# Patient Record
Sex: MALE | Race: OTHER | NOT HISPANIC OR LATINO | URBAN - METROPOLITAN AREA
[De-identification: names, ages, dates, MRNs, and addresses within clinical notes are randomized per-mention and may not be internally consistent; named-entity substitution may affect disease eponyms.]

---

## 2017-01-26 ENCOUNTER — EMERGENCY (EMERGENCY)
Facility: HOSPITAL | Age: 22
LOS: 1 days | Discharge: HOME | End: 2017-01-26
Attending: EMERGENCY MEDICINE
Payer: SELF-PAY

## 2017-01-26 VITALS
HEART RATE: 59 BPM | SYSTOLIC BLOOD PRESSURE: 131 MMHG | WEIGHT: 229.94 LBS | RESPIRATION RATE: 16 BRPM | TEMPERATURE: 98 F | HEIGHT: 76 IN | DIASTOLIC BLOOD PRESSURE: 75 MMHG | OXYGEN SATURATION: 100 %

## 2017-01-26 DIAGNOSIS — S61.411A LACERATION WITHOUT FOREIGN BODY OF RIGHT HAND, INITIAL ENCOUNTER: ICD-10-CM
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
## 2017-01-26 DIAGNOSIS — S60.511A ABRASION OF RIGHT HAND, INITIAL ENCOUNTER: ICD-10-CM

## 2017-01-26 PROCEDURE — 73130 X-RAY EXAM OF HAND: CPT | Mod: 26,RT

## 2017-01-26 PROCEDURE — 99283 EMERGENCY DEPT VISIT LOW MDM: CPT

## 2017-01-26 PROCEDURE — 73130 X-RAY EXAM OF HAND: CPT

## 2017-01-26 PROCEDURE — 99283 EMERGENCY DEPT VISIT LOW MDM: CPT | Mod: 25

## 2017-01-26 RX ORDER — IBUPROFEN 200 MG
800 TABLET ORAL ONCE
Qty: 0 | Refills: 0 | Status: COMPLETED | OUTPATIENT
Start: 2017-01-26 | End: 2017-01-26

## 2017-01-26 RX ORDER — TETANUS TOXOID, REDUCED DIPHTHERIA TOXOID AND ACELLULAR PERTUSSIS VACCINE, ADSORBED 5; 2.5; 8; 8; 2.5 [IU]/.5ML; [IU]/.5ML; UG/.5ML; UG/.5ML; UG/.5ML
0.5 SUSPENSION INTRAMUSCULAR ONCE
Qty: 0 | Refills: 0 | Status: DISCONTINUED | OUTPATIENT
Start: 2017-01-26 | End: 2017-01-26

## 2017-01-26 RX ADMIN — Medication 1 TABLET(S): at 23:03

## 2017-01-26 RX ADMIN — Medication 800 MILLIGRAM(S): at 23:03

## 2017-01-26 NOTE — ED ADULT TRIAGE NOTE - CHIEF COMPLAINT QUOTE
pt presents to ED With dog bite to right palm. multiple puncture wounds noted. bleeding controlled. pt statse he has abrasion noted to right upper thigh from dog. pt stats he knows dog, dog is up to date with shots.

## 2017-01-26 NOTE — ED STATDOCS - NS ED MD SCRIBE ATTENDING SCRIBE SECTIONS
REVIEW OF SYSTEMS/DISPOSITION/PHYSICAL EXAM/PAST MEDICAL/SURGICAL/SOCIAL HISTORY/INTAKE ASSESSMENT/SCREENINGS/VITAL SIGNS( Pullset)/HIV/HISTORY OF PRESENT ILLNESS

## 2017-01-26 NOTE — ED STATDOCS - ATTENDING CONTRIBUTION TO CARE
I, Supa Balderas, performed the initial face to face bedside interview with this patient regarding history of present illness, review of symptoms and relevant past medical, social and family history.  I completed an independent physical examination.  I was the initial provider who evaluated this patient. I have signed out the follow up of any pending tests (i.e. labs, radiological studies) to the ACP.  I have communicated the patient’s plan of care and disposition with the ACP.  The history, relevant review of systems, past medical and surgical history, medical decision making, and physical examination was documented by the scribe in my presence and I attest to the accuracy of the documentation.

## 2017-01-26 NOTE — ED STATDOCS - MEDICAL DECISION MAKING DETAILS
Dog bite to right hand, LHD. XR to evaluate for bony injury given tetanus pain meds and PO abx. Place in super track for irrigation profusely, use Bacitracin and dry sterile dressing. Check results, out-pt follow-ups as instructed.

## 2017-01-26 NOTE — ED STATDOCS - PROGRESS NOTE DETAILS
Pt's wound were cleansed thoroughly and appropriately, proper dressing applied. Pt admits to surgery and + pin placement to right thumb base. Cx on proper wound care. f/u with pcp in 1-2 days.

## 2017-01-26 NOTE — ED STATDOCS - SKIN, MLM
3 jagged lacs to the drake aspect, 1cm, 1cm, 2cm. 2+ superficial abrasion of dorsal aspect of right hand.

## 2017-01-26 NOTE — ED STATDOCS - OBJECTIVE STATEMENT
22 y/o male presents to ED, with family at bedside, c/o lacs and abrasions on his right hand s/p dog bite onset today. The dog is a family dog that is UTD with his shots; the dog was very protective and attacked the pt because he was too close. Left hand dominant. Unsure when he received his last tetanus shot. No further complaints at this time. NKDA.

## 2017-01-27 RX ORDER — IBUPROFEN 200 MG
1 TABLET ORAL
Qty: 15 | Refills: 0 | OUTPATIENT
Start: 2017-01-27 | End: 2017-02-01

## 2020-01-08 ENCOUNTER — TRANSCRIPTION ENCOUNTER (OUTPATIENT)
Age: 25
End: 2020-01-08

## 2020-12-18 ENCOUNTER — TRANSCRIPTION ENCOUNTER (OUTPATIENT)
Age: 25
End: 2020-12-18

## 2023-07-27 ENCOUNTER — APPOINTMENT (OUTPATIENT)
Dept: DERMATOLOGY | Facility: CLINIC | Age: 28
End: 2023-07-27
Payer: COMMERCIAL

## 2023-07-27 PROCEDURE — 99203 OFFICE O/P NEW LOW 30 MIN: CPT

## 2023-11-02 PROBLEM — Z00.00 ENCOUNTER FOR PREVENTIVE HEALTH EXAMINATION: Status: ACTIVE | Noted: 2023-11-02

## 2024-01-04 ENCOUNTER — OFFICE (OUTPATIENT)
Dept: URBAN - METROPOLITAN AREA CLINIC 12 | Facility: CLINIC | Age: 29
Setting detail: OPHTHALMOLOGY
End: 2024-01-04
Payer: MEDICAID

## 2024-01-04 DIAGNOSIS — H52.13: ICD-10-CM

## 2024-01-04 PROCEDURE — SCREF LASIK EVAL: Performed by: OPHTHALMOLOGY

## 2024-01-04 ASSESSMENT — SPHEQUIV_DERIVED
OS_SPHEQUIV: -7.125
OS_SPHEQUIV: -7.125
OD_SPHEQUIV: -5.125
OD_SPHEQUIV: -5.125

## 2024-01-04 ASSESSMENT — REFRACTION_MANIFEST
OS_VA1: 20/20-1
OD_SPHERE: -4.75
OD_AXIS: 065
OS_CYLINDER: -0.25
OS_AXIS: 140
OD_VA1: 20/20-1
OS_SPHERE: -7.00
OD_CYLINDER: -0.75

## 2024-01-04 ASSESSMENT — REFRACTION_CURRENTRX
OS_VPRISM_DIRECTION: SV
OS_SPHERE: -7.00
OD_CYLINDER: -0.25
OS_CYLINDER: SPHERE
OS_AXIS: 000
OD_OVR_VA: 20/
OD_SPHERE: -4.75
OS_OVR_VA: 20/
OD_VPRISM_DIRECTION: SV
OD_AXIS: 041

## 2024-01-04 ASSESSMENT — REFRACTION_AUTOREFRACTION
OD_SPHERE: -4.75
OD_CYLINDER: -0.75
OS_CYLINDER: -0.25
OD_AXIS: 063
OS_SPHERE: -7.00
OS_AXIS: 138

## 2024-01-04 ASSESSMENT — CONFRONTATIONAL VISUAL FIELD TEST (CVF)
OS_FINDINGS: FULL
OD_FINDINGS: FULL

## 2024-01-23 ENCOUNTER — OFFICE (OUTPATIENT)
Facility: LOCATION | Age: 29
Setting detail: OPHTHALMOLOGY
End: 2024-01-23
Payer: MEDICAID

## 2024-01-23 DIAGNOSIS — H52.13: ICD-10-CM

## 2024-01-23 PROCEDURE — SCREF LASIK EVAL: Performed by: OPHTHALMOLOGY

## 2024-01-23 ASSESSMENT — CONFRONTATIONAL VISUAL FIELD TEST (CVF)
OD_FINDINGS: FULL
OS_FINDINGS: FULL

## 2024-01-24 ASSESSMENT — REFRACTION_AUTOREFRACTION
OS_CYLINDER: -0.25
OD_AXIS: 063
OS_SPHERE: -7.00
OS_AXIS: 138
OD_SPHERE: -4.75
OD_CYLINDER: -0.75

## 2024-01-24 ASSESSMENT — REFRACTION_CURRENTRX
OD_OVR_VA: 20/
OS_SPHERE: -7.00
OS_CYLINDER: SPHERE
OD_CYLINDER: -0.25
OD_AXIS: 041
OD_SPHERE: -4.75
OS_VPRISM_DIRECTION: SV
OS_AXIS: 000
OD_VPRISM_DIRECTION: SV
OS_OVR_VA: 20/

## 2024-01-24 ASSESSMENT — REFRACTION_MANIFEST
OS_VA1: 20/20-1
OD_CYLINDER: -0.75
OD_SPHERE: -4.75
OS_SPHERE: -7.00
OD_AXIS: 065
OS_CYLINDER: -0.25
OD_VA1: 20/20-1
OS_AXIS: 140

## 2024-01-24 ASSESSMENT — SPHEQUIV_DERIVED
OS_SPHEQUIV: -7.125
OS_SPHEQUIV: -7.125
OD_SPHEQUIV: -5.125
OD_SPHEQUIV: -5.125

## 2024-01-29 ENCOUNTER — OFFICE (OUTPATIENT)
Dept: URBAN - METROPOLITAN AREA CLINIC 116 | Facility: CLINIC | Age: 29
Setting detail: OPHTHALMOLOGY
End: 2024-01-29
Payer: MEDICAID

## 2024-01-29 DIAGNOSIS — H52.13: ICD-10-CM

## 2024-01-29 DIAGNOSIS — H01.002: ICD-10-CM

## 2024-01-29 DIAGNOSIS — H01.001: ICD-10-CM

## 2024-01-29 DIAGNOSIS — H16.223: ICD-10-CM

## 2024-01-29 DIAGNOSIS — H01.004: ICD-10-CM

## 2024-01-29 DIAGNOSIS — H01.005: ICD-10-CM

## 2024-01-29 PROCEDURE — CLEST CL LENS FIT ESTABLISHED WEARER FITTING ONLY: Performed by: OPTOMETRIST

## 2024-01-29 PROCEDURE — 92014 COMPRE OPH EXAM EST PT 1/>: CPT | Performed by: OPTOMETRIST

## 2024-01-29 ASSESSMENT — REFRACTION_CURRENTRX
OD_VPRISM_DIRECTION: SV
OS_AXIS: 000
OD_AXIS: 041
OS_SPHERE: -6.50
OS_OVR_VA: 20/
OS_CYLINDER: -0.25
OD_OVR_VA: 20/
OD_CYLINDER: -0.25
OD_AXIS: 065
OS_VPRISM_DIRECTION: SV
OS_OVR_VA: 20/
OD_SPHERE: -4.75
OD_VPRISM_DIRECTION: SV
OS_SPHERE: -7.00
OD_OVR_VA: 20/
OS_CYLINDER: SPHERE
OS_AXIS: 135
OS_VPRISM_DIRECTION: SV
OD_SPHERE: -4.50
OD_CYLINDER: -0.25

## 2024-01-29 ASSESSMENT — SPHEQUIV_DERIVED
OD_SPHEQUIV: -5.25
OS_SPHEQUIV: -6.875
OS_SPHEQUIV: -7.125
OD_SPHEQUIV: -5.125

## 2024-01-29 ASSESSMENT — REFRACTION_MANIFEST
OS_AXIS: 140
OD_SPHERE: -5.00
OD_CYLINDER: SPH
OS_SPHERE: -6.50
OS_VA1: 20/20-1
OD_CYLINDER: -0.75
OD_VA1: 20/20-1
OD_SPHERE: -4.75
OS_CYLINDER: SPH
OS_CYLINDER: -0.25
OS_SPHERE: -7.00
OD_AXIS: 065

## 2024-01-29 ASSESSMENT — REFRACTION_AUTOREFRACTION
OS_AXIS: 140
OD_CYLINDER: -0.50
OS_SPHERE: -6.75
OD_SPHERE: -5.00
OD_AXIS: 035
OS_CYLINDER: -0.25

## 2024-01-29 ASSESSMENT — CONFRONTATIONAL VISUAL FIELD TEST (CVF)
OD_FINDINGS: FULL
OS_FINDINGS: FULL

## 2024-01-29 ASSESSMENT — SUPERFICIAL PUNCTATE KERATITIS (SPK)
OS_SPK: 1+ 2+
OD_SPK: 1+ 2+

## 2024-01-29 ASSESSMENT — LID EXAM ASSESSMENTS
OS_BLEPHARITIS: LLL LUL T
OD_BLEPHARITIS: RLL RUL T

## 2024-03-11 ENCOUNTER — EMERGENCY (EMERGENCY)
Facility: HOSPITAL | Age: 29
LOS: 1 days | Discharge: HOME | End: 2024-03-11
Attending: EMERGENCY MEDICINE
Payer: MEDICAID

## 2024-03-11 VITALS
SYSTOLIC BLOOD PRESSURE: 116 MMHG | RESPIRATION RATE: 18 BRPM | DIASTOLIC BLOOD PRESSURE: 68 MMHG | OXYGEN SATURATION: 99 % | HEART RATE: 81 BPM | TEMPERATURE: 98 F

## 2024-03-11 PROCEDURE — 94640 AIRWAY INHALATION TREATMENT: CPT

## 2024-03-11 PROCEDURE — 99283 EMERGENCY DEPT VISIT LOW MDM: CPT

## 2024-03-11 PROCEDURE — 99282 EMERGENCY DEPT VISIT SF MDM: CPT

## 2024-03-11 RX ORDER — FLUTICASONE PROPIONATE 50 MCG
1 SPRAY, SUSPENSION NASAL ONCE
Refills: 0 | Status: COMPLETED | OUTPATIENT
Start: 2024-03-11 | End: 2024-03-11

## 2024-03-11 RX ADMIN — Medication 1 SPRAY(S): at 23:31

## 2024-03-11 NOTE — ED PROVIDER NOTE - CLINICAL SUMMARY MEDICAL DECISION MAKING FREE TEXT BOX
29 yo male no PMHx presents to ED c/o right nostril being stuffed x2 weeks. Worse at night. Sxms began after medicating with Afrin several times a day for 2 weeks straight while sick. ADdvised to not use Afrin, start Flonase for sxms relief. Medically stable for discharge.

## 2024-03-11 NOTE — ED PROVIDER NOTE - OBJECTIVE STATEMENT
27 yo male no PMHx presents to ED c/o right nostril being stuffed x2 weeks. Worse at night. At this time, "not too bad right now." Patient was medicating with Afrin several times a day for 2 weeks straight while sick. No further complaints at this time.  Denies fevers, discharge.

## 2024-03-11 NOTE — ED PROVIDER NOTE - PATIENT PORTAL LINK FT
You can access the FollowMyHealth Patient Portal offered by [facility] by registering at the following website: [email or web address]. By joining [facility]’s FollowMyHealth portal, you will also be able to view your health information using other applications (apps) compatible with our system.

## 2024-03-11 NOTE — ED PROVIDER NOTE - PHYSICAL EXAMINATION
Gen: Nontoxic, well appearing, in NAD.  Skin: Warm and dry as visualized.  Head: NC/AT.  Eyes: PERRLA. EOMI.  Nose: Right sided turbinates swollen compared to left.   Neck: Supple, FROM. Trachea midline.   Resp: No distress.  Cardio: Well perfused.  Ext: No deformities. MAEx4. FROM.   Neuro: A&Ox3. Appears nonfocal.   Psych: Normal affect and mood.

## 2024-03-11 NOTE — ED PROVIDER NOTE - NSFOLLOWUPINSTRUCTIONS_ED_ALL_ED_FT
- Flonase: 1-2 sprays once daily.   - Please bring all documentation from your ED visit to any related future follow up appointment.  - Please call to schedule follow up appointment with your primary care physician within 24-48 hours.  - Please seek immediate medical attention or return to the ED for any new/worsening, signs/symptoms, or concerns.    Feel better!

## 2024-03-11 NOTE — ED PROVIDER NOTE - ATTENDING APP SHARED VISIT CONTRIBUTION OF CARE
Skinny: I performed a face to face bedside interview with patient regarding history of present illness, review of symptoms and past medical history. I completed an independent physical exam.  I have discussed patient's plan of care with advanced care provider.   I agree with note as stated above including HISTORY OF PRESENT ILLNESS, HIV, PAST MEDICAL/SURGICAL/FAMILY/SOCIAL HISTORY, ALLERGIES AND HOME MEDICATIONS, REVIEW OF SYSTEMS, PHYSICAL EXAM, MEDICAL DECISION MAKING and any PROGRESS NOTES during the time I functioned as the attending physician for this patient  unless otherwise noted. My brief assessment is as follows: no pmh with flu several weeks ago, using afrin frequently over past two weeks stating difficult breathing through right nose. no other complaints. non toxic, well appearing. some inflammation right turbinates, nl resp rate/effort. flonase, return precautions.

## 2024-03-11 NOTE — ED PROVIDER NOTE - CARE PROVIDER_API CALL
Clyde Whitman  Otolaryngology  [address] [place] [address], [address]  [place] [address]  Phone: [phone]  Fax: [phone]  Follow Up Time:

## 2024-04-03 ENCOUNTER — APPOINTMENT (OUTPATIENT)
Dept: ORTHOPEDIC SURGERY | Facility: CLINIC | Age: 29
End: 2024-04-03
Payer: MEDICAID

## 2024-04-03 VITALS — HEIGHT: 76 IN | WEIGHT: 240 LBS | BODY MASS INDEX: 29.22 KG/M2

## 2024-04-03 DIAGNOSIS — M23.201 DERANGEMENT OF UNSPECIFIED LATERAL MENISCUS DUE TO OLD TEAR OR INJURY, LEFT KNEE: ICD-10-CM

## 2024-04-03 DIAGNOSIS — Z78.9 OTHER SPECIFIED HEALTH STATUS: ICD-10-CM

## 2024-04-03 PROCEDURE — 99204 OFFICE O/P NEW MOD 45 MIN: CPT

## 2024-04-03 NOTE — DISCUSSION/SUMMARY
[de-identified] : Assessment: The patient is a 28 year old male with left knee pain and physical exam findings consistent with h/o LMT of left knee.  Patient and I discussed their symptoms. Discussed findings of today's exam and possible causes of patient's pain. Educated patient on their most probable diagnosis. Reviewed possible courses of treatment, and we collaboratively decided best course of treatment at this time will include:  1. MR Arthrogram of left knee to eval for LMT  The patient's current medication management of their orthopedic diagnosis was reviewed today:   (1) We discussed a comprehensive treatment plan that included possible pharmaceutical management involving the use of prescription strength medications including but not limited to options such as oral Naprosyn 500mg BID, once daily Meloxicam 15 mg, or 500-650 mg Tylenol versus over the counter oral medications and topical prescription NSAID Pennsaid vs over the counter Voltaren gel.   (2) There is a moderate risk of morbidity with further treatment, especially from use of prescription strength medications and possible side effects of these medications which include upset stomach with oral medications, skin reactions to topical medications and cardiac/renal issues with long term use.   (3) I recommended that the patient follow-up with their medical physician to discuss any significant specific potential issues with long term medication use such as interactions with current medications or with exacerbation of underlying medical comorbidities.   (4) The benefits and risks associated with use of injectable, oral or topical, prescription and over the counter anti-inflammatory medications were discussed with the patient. The patient voiced understanding of the risks including but not limited to bleeding, stroke, kidney dysfunction, heart disease, and were referred to the black box warning label for further information.  Follow up after MRI.

## 2024-04-03 NOTE — HISTORY OF PRESENT ILLNESS
[de-identified] : The patient is a 28 year old [right] hand dominant M who presents today complaining of left knee pain.  Date of Injury/Onset: 02/24 Pain: At Rest: 0/10 With Activity:  3/10 Mechanism of injury: NKI, was previously getting knee drained due to Baker's cyst. Was tackled in [employer] in June 2023 and had immediate pain This is [not] a Work Related Injury being treated under Worker's Compensation. This is [not] an athletic injury occurring associated with an interscholastic or organized sports team. Quality of symptoms: swelling, sharp pains, soreness in posterior and lateral aspect of knee with certain movements Improves with: Worse with: running, walking for long periods of time.  Prior treatment/ Imaging: post-op and pre surgery MRI's  Out of work/sport: Currently working School/Sport/Position/Occupation:  Additional Information: had 2 lateral meniscus surgery, 1st in August 2014, 2nd in May 2015, right knee patellar tendon repair at [place] in the past  **Patient refuses X-rays in office today**

## 2024-04-03 NOTE — IMAGING
[de-identified] : The patient is a well appearing 28 year old male of their stated age. Patient ambulates with a normal gait. Negative straight leg raise bilateral   LEFT Knee:                          ROM:  0-145 degrees   Lachman: Negative Pivot Shift: Negative Anterior Drawer: Negative Posterior Drawer / Sag: Negative Varus Stress 0 degrees: Stable Varus Stress 30 degrees: Stable Valgus Stress 0 degrees: Stable Valgus Stress 30 degrees: Stable Medial Shanna: Negative Lateral Shanna: Positive Patella Glide: 2+ Patella Apprehension: Negative Patella Grind: Negative   Palpation: Medial Joint Line: Nontender Lateral Joint Line: TTP Medial Collateral Ligament: Nontender Lateral Collateral Ligament/PLC: Nontender Distal Femur: Nontender Proximal Tibia: Nontender Tibial Tubercle: Nontender Distal Pole Patella: Nontender Quadriceps Tendon: Nontender &  Intact Patella Tendon: Nontender &  Intact Medial Distal Hamstring/PES: Nontender Lateral Distal Hamstring: Nontender & Stable Iliotibial Band: Nontender Medial Patellofemoral Ligament: Nontender Adductor: Nontender Proximal GSC-Plantaris: Nontender Calf: Supple & Nontender   Inspection: Incisions are well healed Deformity: No Erythema: No Ecchymosis: No Abrasions: No Effusion: Moderate Prepatellar Bursitis: No   Neurologic Exam: Sensation L4-S1: Grossly Intact   Motor Exam: Quadriceps: 5 out of 5 Hamstrings: 5 out of 5 EHL: 5 out of 5 FHL: 5 out of 5 TA: 5 out of 5 GS: 5 out of 5   Circulatory/Pulses: Dorsalis Pedis: 2+ Posterior Tibialis: 2+

## 2024-04-09 ENCOUNTER — RESULT REVIEW (OUTPATIENT)
Age: 29
End: 2024-04-09

## 2024-04-10 ENCOUNTER — APPOINTMENT (OUTPATIENT)
Dept: ORTHOPEDIC SURGERY | Facility: CLINIC | Age: 29
End: 2024-04-10
Payer: MEDICAID

## 2024-04-10 PROCEDURE — 99214 OFFICE O/P EST MOD 30 MIN: CPT

## 2024-04-16 NOTE — DATA REVIEWED
[MRI] : MRI [Left] : left [Knee] : knee [Report was reviewed and noted in the chart] : The report was reviewed and noted in the chart [I independently reviewed and interpreted images and report] : I independently reviewed and interpreted images and report [I reviewed the films/CD] : I reviewed the films/CD [FreeTextEntry1] : Isela 4/9/24 1. Postoperative changes of the medial meniscus with truncation of the free edge of the body. Fraying/truncation the free edge body medial meniscus. 2. Full-thickness cartilage loss in the patellofemoral and lateral femorotibial compartments. 3. Mild lateral subluxation of the patella with increased TT-TG distance.

## 2024-04-16 NOTE — DISCUSSION/SUMMARY
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   ---------------------------     Plan: - Discussed HA inj in left knee. Patient would like to proceed. Submitted auth for Euflexxa - All questions and concerns were answered     Follow-up:  after obtaining auth

## 2024-04-16 NOTE — IMAGING
[de-identified] : The patient is a well appearing 28 year old male of their stated age. Patient ambulates with a normal gait. Negative straight leg raise bilateral   LEFT Knee:                          ROM:  0-145 degrees   Lachman: Negative Pivot Shift: Negative Anterior Drawer: Negative Posterior Drawer / Sag: Negative Varus Stress 0 degrees: Stable Varus Stress 30 degrees: Stable Valgus Stress 0 degrees: Stable Valgus Stress 30 degrees: Stable Medial Shanna: Negative Lateral Shanna: Positive Patella Glide: 2+ Patella Apprehension: Negative Patella Grind: Negative   Palpation: Medial Joint Line: Nontender Lateral Joint Line: TTP Medial Collateral Ligament: Nontender Lateral Collateral Ligament/PLC: Nontender Distal Femur: Nontender Proximal Tibia: Nontender Tibial Tubercle: Nontender Distal Pole Patella: Nontender Quadriceps Tendon: Nontender &  Intact Patella Tendon: Nontender &  Intact Medial Distal Hamstring/PES: Nontender Lateral Distal Hamstring: Nontender & Stable Iliotibial Band: Nontender Medial Patellofemoral Ligament: Nontender Adductor: Nontender Proximal GSC-Plantaris: Nontender Calf: Supple & Nontender   Inspection: Incisions are well healed Deformity: No Erythema: No Ecchymosis: No Abrasions: No Effusion: Moderate Prepatellar Bursitis: No   Neurologic Exam: Sensation L4-S1: Grossly Intact   Motor Exam: Quadriceps: 5 out of 5 Hamstrings: 5 out of 5 EHL: 5 out of 5 FHL: 5 out of 5 TA: 5 out of 5 GS: 5 out of 5   Circulatory/Pulses: Dorsalis Pedis: 2+ Posterior Tibialis: 2+

## 2024-04-16 NOTE — HISTORY OF PRESENT ILLNESS
[de-identified] : 4/10/24: Patient here for follow up of left knee and review MR Arthrogram results  The patient is a 28 year old [right] hand dominant M who presents today complaining of left knee pain.  Date of Injury/Onset: 02/24 Pain: At Rest: 0/10 With Activity:  3/10 Mechanism of injury: NKI, was previously getting knee drained due to Baker's cyst. Was tackled in [employer] in June 2023 and had immediate pain This is [not] a Work Related Injury being treated under Worker's Compensation. This is [not] an athletic injury occurring associated with an interscholastic or organized sports team. Quality of symptoms: swelling, sharp pains, soreness in posterior and lateral aspect of knee with certain movements Improves with: Worse with: running, walking for long periods of time.  Prior treatment/ Imaging: post-op and pre surgery MRI's  Out of work/sport: Currently working School/Sport/Position/Occupation:  Additional Information: had 2 lateral meniscus surgery, 1st in August 2014, 2nd in May 2015, right knee patellar tendon repair at [place] in the past

## 2024-04-24 ENCOUNTER — APPOINTMENT (OUTPATIENT)
Dept: ORTHOPEDIC SURGERY | Facility: CLINIC | Age: 29
End: 2024-04-24
Payer: MEDICAID

## 2024-04-24 PROCEDURE — 20610 DRAIN/INJ JOINT/BURSA W/O US: CPT | Mod: LT

## 2024-04-24 PROCEDURE — J3490M: CUSTOM

## 2024-04-24 PROCEDURE — 99214 OFFICE O/P EST MOD 30 MIN: CPT | Mod: 25

## 2024-04-24 NOTE — HISTORY OF PRESENT ILLNESS
[de-identified] : 04/24/24: Follow up today for Lt knee. Pt's current pain levels are a 5/10. Euflexxa not authorized by his insurance.  4/10/24: Patient here for follow up of left knee and review MR Arthrogram results  The patient is a 28 year old [right] hand dominant M who presents today complaining of left knee pain.  Date of Injury/Onset: 02/24 Pain: At Rest: 0/10 With Activity:  3/10 Mechanism of injury: NKI, was previously getting knee drained due to Baker's cyst. Was tackled in [email or web address] in June 2023 and had immediate pain This is [not] a Work Related Injury being treated under Worker's Compensation. This is [not] an athletic injury occurring associated with an interscholastic or organized sports team. Quality of symptoms: swelling, sharp pains, soreness in posterior and lateral aspect of knee with certain movements Improves with: Worse with: running, walking for long periods of time.  Prior treatment/ Imaging: post-op and pre surgery MRI's  Out of work/sport: Currently working School/Sport/Position/Occupation:  Additional Information: had 2 lateral meniscus surgery, 1st in August 2014, 2nd in May 2015, right knee patellar tendon repair at [place] in the past

## 2024-04-24 NOTE — DISCUSSION/SUMMARY
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   Patient requests aspiration and CSI today for pain relief - tolerated well ---------------------------     Plan: - Discussed HA inj in left knee. Patient would like to proceed. Submitted auth for Visco-[employer] - All questions and concerns were answered     Follow-up:  after obtaining auth

## 2024-04-24 NOTE — PROCEDURE
[FreeTextEntry3] : Patient Identification  Name/: Verbal with patient and/or family    Procedure Verification:  Procedure confirmed with patient or family/designee  Consent for procedure: Verbal Consent Given  Relevant documentation completed, reviewed, and signed  Clinical indications for procedure confirmed    Time-out with all members of procedure team immediately prior to procedure:  Correct patient identified. Agreement on procedure. Correct side and site.    W/O US GUIDANCE KNEE INJECTION (STEROID) - LEFT  After verbal consent and identification of the correct patient and correct site, the superolateral left knee was prepped using alcohol swabs and betadine. This was allowed time to air dry. A mixture of 1cc Celestone 6mg/ml, 2cc Lidocaine 1%, and 2cc Bupivacaine 0.5% was injected with US guidance into the suprapatellar pouch using a sterile 22G needle after ethyl chloride spray for skin anesthesia. The patient tolerated the procedure well. After-care instructions were provided and included instructions to ice the area and to call if redness, pain, or fever develop.Visualization of the needle and placement of the injection was performed without any complications. Ultrasound was used for visualization, precise injection in area of tear, and / or prior failure or difficult injection

## 2024-04-24 NOTE — IMAGING
[de-identified] : The patient is a well appearing 28 year old male of their stated age. Patient ambulates with a normal gait. Negative straight leg raise bilateral   LEFT Knee:                          ROM:  0-145 degrees   Lachman: Negative Pivot Shift: Negative Anterior Drawer: Negative Posterior Drawer / Sag: Negative Varus Stress 0 degrees: Stable Varus Stress 30 degrees: Stable Valgus Stress 0 degrees: Stable Valgus Stress 30 degrees: Stable Medial Shanna: Negative Lateral Shanna: Positive Patella Glide: 2+ Patella Apprehension: Negative Patella Grind: Negative   Palpation: Medial Joint Line: Nontender Lateral Joint Line: TTP Medial Collateral Ligament: Nontender Lateral Collateral Ligament/PLC: Nontender Distal Femur: Nontender Proximal Tibia: Nontender Tibial Tubercle: Nontender Distal Pole Patella: Nontender Quadriceps Tendon: Nontender &  Intact Patella Tendon: Nontender &  Intact Medial Distal Hamstring/PES: Nontender Lateral Distal Hamstring: Nontender & Stable Iliotibial Band: Nontender Medial Patellofemoral Ligament: Nontender Adductor: Nontender Proximal GSC-Plantaris: Nontender Calf: Supple & Nontender   Inspection: Incisions are well healed Deformity: No Erythema: No Ecchymosis: No Abrasions: No Effusion: Moderate Prepatellar Bursitis: No   Neurologic Exam: Sensation L4-S1: Grossly Intact   Motor Exam: Quadriceps: 5 out of 5 Hamstrings: 5 out of 5 EHL: 5 out of 5 FHL: 5 out of 5 TA: 5 out of 5 GS: 5 out of 5   Circulatory/Pulses: Dorsalis Pedis: 2+ Posterior Tibialis: 2+

## 2024-05-22 ENCOUNTER — APPOINTMENT (OUTPATIENT)
Dept: ORTHOPEDIC SURGERY | Facility: CLINIC | Age: 29
End: 2024-05-22
Payer: MEDICAID

## 2024-05-22 DIAGNOSIS — Z98.890 OTHER SPECIFIED POSTPROCEDURAL STATES: ICD-10-CM

## 2024-05-22 DIAGNOSIS — M17.12 UNILATERAL PRIMARY OSTEOARTHRITIS, LEFT KNEE: ICD-10-CM

## 2024-05-22 DIAGNOSIS — M94.262 CHONDROMALACIA, LEFT KNEE: ICD-10-CM

## 2024-05-22 PROCEDURE — 99214 OFFICE O/P EST MOD 30 MIN: CPT

## 2024-05-30 PROBLEM — M17.12 ARTHRITIS OF KNEE, LEFT: Status: ACTIVE | Noted: 2024-04-10

## 2024-05-30 PROBLEM — Z98.890: Status: ACTIVE | Noted: 2024-04-03

## 2024-05-30 PROBLEM — Z98.890 HISTORY OF KNEE SURGERY: Status: ACTIVE | Noted: 2024-04-03

## 2024-05-30 PROBLEM — M17.12 PATELLOFEMORAL ARTHRITIS OF LEFT KNEE: Status: ACTIVE | Noted: 2024-04-24

## 2024-05-30 PROBLEM — M94.262 CHONDROMALACIA OF LEFT KNEE: Status: ACTIVE | Noted: 2024-04-10

## 2024-05-30 NOTE — DISCUSSION/SUMMARY
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  ---------------------------     Plan: - Discussed HA inj in left knee. Patient would like to proceed. Will attempt to obtain auth again. - Also discussed referral to my partner who specializes in cartilage restoration - patient defers at this time and will reconsider in the future     Follow-up:  after obtaining auth

## 2024-05-30 NOTE — IMAGING
[de-identified] : The patient is a well appearing 28 year old male of their stated age. Patient ambulates with a normal gait. Negative straight leg raise bilateral   LEFT Knee:                          ROM:  0-145 degrees   Lachman: Negative Pivot Shift: Negative Anterior Drawer: Negative Posterior Drawer / Sag: Negative Varus Stress 0 degrees: Stable Varus Stress 30 degrees: Stable Valgus Stress 0 degrees: Stable Valgus Stress 30 degrees: Stable Medial Shanna: Negative Lateral Shanna: Positive Patella Glide: 2+ Patella Apprehension: Negative Patella Grind: Negative   Palpation: Medial Joint Line: Nontender Lateral Joint Line: TTP Medial Collateral Ligament: Nontender Lateral Collateral Ligament/PLC: Nontender Distal Femur: Nontender Proximal Tibia: Nontender Tibial Tubercle: Nontender Distal Pole Patella: Nontender Quadriceps Tendon: Nontender &  Intact Patella Tendon: Nontender &  Intact Medial Distal Hamstring/PES: Nontender Lateral Distal Hamstring: Nontender & Stable Iliotibial Band: Nontender Medial Patellofemoral Ligament: Nontender Adductor: Nontender Proximal GSC-Plantaris: Nontender Calf: Supple & Nontender   Inspection: Incisions are well healed Deformity: No Erythema: No Ecchymosis: No Abrasions: No Effusion: Moderate Prepatellar Bursitis: No   Neurologic Exam: Sensation L4-S1: Grossly Intact   Motor Exam: Quadriceps: 5 out of 5 Hamstrings: 5 out of 5 EHL: 5 out of 5 FHL: 5 out of 5 TA: 5 out of 5 GS: 5 out of 5   Circulatory/Pulses: Dorsalis Pedis: 2+ Posterior Tibialis: 2+ W Plasty Text: The lesion was extirpated to the level of the fat with a #15 scalpel blade.  Given the location of the defect, shape of the defect and the proximity to free margins a W-plasty was deemed most appropriate for repair.  Using a sterile surgical marker, the appropriate transposition arms of the W-plasty were drawn incorporating the defect and placing the expected incisions within the relaxed skin tension lines where possible.    The area thus outlined was incised deep to adipose tissue with a #15 scalpel blade.  The skin margins were undermined to an appropriate distance in all directions utilizing iris scissors.  The opposing transposition arms were then transposed into place in opposite direction and anchored with interrupted buried subcutaneous sutures.

## 2024-05-30 NOTE — HISTORY OF PRESENT ILLNESS
[de-identified] : 05/22/24: Follow up for the left knee. Patient was denied the appario, states he feels the same as last visit. He is interested in getting his knee drained.   04/24/24: Follow up today for Lt knee. Pt's current pain levels are a 5/10. Euflexxa not authorized by his insurance.  4/10/24: Patient here for follow up of left knee and review MR Arthrogram results  04/03/24: The patient is a 28 year old [right] hand dominant M who presents today complaining of left knee pain.  Date of Injury/Onset: 02/24 Pain: At Rest: 0/10 With Activity:  3/10 Mechanism of injury: NKI, was previously getting knee drained due to Baker's cyst. Was tackled in [email or web address] in June 2023 and had immediate pain This is [not] a Work Related Injury being treated under Worker's Compensation. This is [not] an athletic injury occurring associated with an interscholastic or organized sports team. Quality of symptoms: swelling, sharp pains, soreness in posterior and lateral aspect of knee with certain movements Improves with: Worse with: running, walking for long periods of time.  Prior treatment/ Imaging: post-op and pre surgery MRI's  Out of work/sport: Currently working School/Sport/Position/Occupation:  Additional Information: had 2 lateral meniscus surgery, 1st in August 2014, 2nd in May 2015, right knee patellar tendon repair at [place] in the past

## 2024-07-23 ENCOUNTER — OFFICE (OUTPATIENT)
Facility: LOCATION | Age: 29
Setting detail: OPHTHALMOLOGY
End: 2024-07-23
Payer: COMMERCIAL

## 2024-07-23 DIAGNOSIS — H52.13: ICD-10-CM

## 2024-07-23 PROBLEM — H01.001 BLEPHARITIS; RIGHT UPPER LID, RIGHT LOWER LID, LEFT UPPER LID, LEFT LOWER LID: Status: ACTIVE | Noted: 2024-07-23

## 2024-07-23 PROBLEM — H01.004 BLEPHARITIS; RIGHT UPPER LID, RIGHT LOWER LID, LEFT UPPER LID, LEFT LOWER LID: Status: ACTIVE | Noted: 2024-07-23

## 2024-07-23 PROBLEM — H01.005 BLEPHARITIS; RIGHT UPPER LID, RIGHT LOWER LID, LEFT UPPER LID, LEFT LOWER LID: Status: ACTIVE | Noted: 2024-07-23

## 2024-07-23 PROBLEM — H01.002 BLEPHARITIS; RIGHT UPPER LID, RIGHT LOWER LID, LEFT UPPER LID, LEFT LOWER LID: Status: ACTIVE | Noted: 2024-07-23

## 2024-07-23 PROCEDURE — SCREF LASIK EVAL: Performed by: OPHTHALMOLOGY

## 2024-07-23 ASSESSMENT — CONFRONTATIONAL VISUAL FIELD TEST (CVF)
OD_FINDINGS: FULL
OS_FINDINGS: FULL

## 2024-07-23 ASSESSMENT — LID EXAM ASSESSMENTS
OS_BLEPHARITIS: LLL LUL T
OD_BLEPHARITIS: RLL RUL T

## 2024-09-03 ENCOUNTER — APPOINTMENT (OUTPATIENT)
Dept: ORTHOPEDIC SURGERY | Facility: CLINIC | Age: 29
End: 2024-09-03
Payer: MEDICAID

## 2024-09-03 VITALS — WEIGHT: 240 LBS | BODY MASS INDEX: 29.22 KG/M2 | HEIGHT: 76 IN

## 2024-09-03 DIAGNOSIS — S89.91XA UNSPECIFIED INJURY OF RIGHT LOWER LEG, INITIAL ENCOUNTER: ICD-10-CM

## 2024-09-03 DIAGNOSIS — M25.521 PAIN IN RIGHT ELBOW: ICD-10-CM

## 2024-09-03 DIAGNOSIS — F17.200 NICOTINE DEPENDENCE, UNSPECIFIED, UNCOMPLICATED: ICD-10-CM

## 2024-09-03 PROCEDURE — 73080 X-RAY EXAM OF ELBOW: CPT | Mod: RT

## 2024-09-03 PROCEDURE — 99214 OFFICE O/P EST MOD 30 MIN: CPT

## 2024-09-04 PROBLEM — M25.521 RIGHT ELBOW PAIN: Status: ACTIVE | Noted: 2024-09-03

## 2024-09-04 NOTE — HISTORY OF PRESENT ILLNESS
[de-identified] : The patient is a 29 year old left hand dominant male who presents today complaining of right elbow.   Date of Injury/Onset: ~ 2024 Pain:    At Rest: 0/10  With Activity:  4/10  Mechanism of injury: NKI, patient is experiencing swelling and fluid build-up  This is NOT a Work Related Injury being treated under Worker's Compensation. This is NOT an athletic injury occurring associated with an interscholastic or organized sports team. Quality of symptoms: sharp  Improves with: aspiration Worse with: pressure and lifting  Prior treatment: Orthopedic - Dr. Jackson would aspirate the right elbow, but he is currently "out of town"  Prior Imagin24 MRI @  Out of work/sport: none School/Sport/Position/Occupation: Full-time security  Additional Information: None

## 2024-09-04 NOTE — DATA REVIEWED
[FreeTextEntry1] : 9/3/24 OC X-RAY Right Elbow: This scan was reviewed and interpreted by Dr. Marcum, and his findings are- swelling of olecranon bursa soft tissue 2 posterior calcific loose bodies

## 2024-09-04 NOTE — DISCUSSION/SUMMARY
[de-identified] : Reviewed all X Ray images with patient today and interpretation was provided. pt had prior olecranon aspiration with recurrence of bursal fluid, + 2 calcific posterior loose bodies Discussed treatment options, including surgery. Patient would like to follow through with surgery. Patient will get in contact with the  to schedule surgery.      Surgical Consent:  -Conservative treatment, nontreatment, nonsurgical intervention and surgical intervention treatment options have been reviewed with the patient.  The patient continues to be symptomatic [and has failed conservative treatment], and elects to move forward with surgical intervention.  The patient is indicated for Right elbow excision of olecranon bursa, removal of loose body with arthrotomy and all indicated procedures. As such the alternatives, benefits and risks, of the above procedure, including but not limited to bleeding, infection, neurovascular injury, loss of limb, loss of life,  DVT, PE, RSD, inability to return to previous level of activity, inability to return to previous level of employment, advancement of or to osteoarthritic changes, joint instability or motion loss, hardware failure or migration,  failure to resolve all symptoms, failure to return to sports and need for further procedures, as well as specific risk of [wound dehiscence, infection, recurrence of bursitis] were discussed with the patient and/or their legal guardian who agreed to move forward with surgical intervention.  They have reviewed and signed the consent form today after expressing understanding of the above documented conversation. The patient or their representative will contact my office as instructed on the preoperative instruction sheet they received today to schedule surgery in a timely manner as discussed.  -As a post-operative protocol, I am prescribing an iceless cold/heat compression therapy device for at home use to be used 3-5 times per day at 40 degrees for 35 days as an alternative to pain medication. I would like my patient to begin with simultaneous cold & compression therapy at 10mm pressure. At the patients follow up I will determine whether they should continue with cold, or if they should transition to contrast cold/heat compression therapy. Unlike a conventional cold therapy unit that requires ice, the ThermX iceless device is set to a prescribed temperature that it will remain throughout the entire duration of use, whether that be cold compression, heat compression, or contrast compression. Cold therapy units that depend on ice melt over a very short period and do not provide compression which limits the compliance and effectiveness for pain/inflammation reduction that I am targeting for my patient. I have reached out to [facility] [place] to supply this device as they are the exclusive provider of the ThermX and the patient will be contacted and instructed on how to utilize the device. ------------       ----------------------------------------------- Home Exercise The patient is instructed on a home exercise program.  OPHELIA LEYVA Acting as a Scribe for Dr. Jossue GRAMAJO, Ophelia Leyva, attest that this documentation has been prepared under the direction and in the presence of Provider Dr. Marcum.  Activity Modification The patient was advised to modify their activities.  Dx / Natural History The patient was advised of the diagnosis. The natural history of the pathology was explained in full to the patient in layman's terms. Several different treatment options were discussed and explained in full to the patient including the risks and benefits of both surgical and non-surgical treatments.  All questions and concerns were answered.  Pain Guide Activities The patient was advised to let pain guide the gradual advancement of activities.  RICE I explained to the patient that rest, ice, compression, and elevation would benefit them. They may return to activity after follow-up or when they no longer have any pain.  The patient's current medication management of their orthopedic diagnosis was reviewed today: (1) We discussed a comprehensive treatment plan that included possible pharmaceutical management involving the use of prescription strength medications including but not limited to options such as oral Naprosyn 500mg BID, once daily Meloxicam 15 mg, or 500-650 mg Tylenol versus over the counter oral medications and topical prescription NSAID Pennsaid vs over the counter Voltaren gel. (2) There is a moderate risk of morbidity with further treatment, especially from use of prescription strength medications and possible side effects of these medications which include upset stomach with oral medications, skin reactions to topical medications and cardiac/renal issues with long term use. (3) I recommended that the patient follow-up with their medical physician to discuss any significant specific potential issues with long term medication use such as interactions with current medications or with exacerbation of underlying medical comorbidities. (4) The benefits and risks associated with use of injectable, oral or topical, prescription and over the counter anti-inflammatory medications were discussed with the patient. The patient voiced understanding of the risks including but not limited to bleeding, stroke, kidney dysfunction, heart disease, and were referred to the black box warning label for further information.

## 2024-09-04 NOTE — PHYSICAL EXAM
[de-identified] : Neurovascularly intact distally  Right Elbow: small effusion olecranon bursa tenderness palpable calcific bodies triceps tenderness full ROM ligamental stable

## 2024-09-04 NOTE — ADDENDUM
[FreeTextEntry1] : Documented by Adin Urias acting as a scribe for Dr. Marcum and Devon Rider PA-C on 09/03/2024 and was presence for the following sections: Physical Exam; Data Reviewed; Assessment; Discussion/Summary. All medical record entries made by the Scribe were at my, Dr. Marcum, and Devon Rider, direction and personally dictated by me on 09/03/2024. I have reviewed the chart and agree that the record accurately reflects my personal performance of the history, physical exam, procedure and imaging.

## 2024-09-04 NOTE — DISCUSSION/SUMMARY
[de-identified] : Reviewed all X Ray images with patient today and interpretation was provided. pt had prior olecranon aspiration with recurrence of bursal fluid, + 2 calcific posterior loose bodies Discussed treatment options, including surgery. Patient would like to follow through with surgery. Patient will get in contact with the  to schedule surgery.      Surgical Consent:  -Conservative treatment, nontreatment, nonsurgical intervention and surgical intervention treatment options have been reviewed with the patient.  The patient continues to be symptomatic [and has failed conservative treatment], and elects to move forward with surgical intervention.  The patient is indicated for Right elbow excision of olecranon bursa, removal of loose body with arthrotomy and all indicated procedures. As such the alternatives, benefits and risks, of the above procedure, including but not limited to bleeding, infection, neurovascular injury, loss of limb, loss of life,  DVT, PE, RSD, inability to return to previous level of activity, inability to return to previous level of employment, advancement of or to osteoarthritic changes, joint instability or motion loss, hardware failure or migration,  failure to resolve all symptoms, failure to return to sports and need for further procedures, as well as specific risk of [wound dehiscence, infection, recurrence of bursitis] were discussed with the patient and/or their legal guardian who agreed to move forward with surgical intervention.  They have reviewed and signed the consent form today after expressing understanding of the above documented conversation. The patient or their representative will contact my office as instructed on the preoperative instruction sheet they received today to schedule surgery in a timely manner as discussed.  -As a post-operative protocol, I am prescribing an iceless cold/heat compression therapy device for at home use to be used 3-5 times per day at 40 degrees for 35 days as an alternative to pain medication. I would like my patient to begin with simultaneous cold & compression therapy at 10mm pressure. At the patients follow up I will determine whether they should continue with cold, or if they should transition to contrast cold/heat compression therapy. Unlike a conventional cold therapy unit that requires ice, the ThermX iceless device is set to a prescribed temperature that it will remain throughout the entire duration of use, whether that be cold compression, heat compression, or contrast compression. Cold therapy units that depend on ice melt over a very short period and do not provide compression which limits the compliance and effectiveness for pain/inflammation reduction that I am targeting for my patient. I have reached out to [email or web address] [place] to supply this device as they are the exclusive provider of the ThermX and the patient will be contacted and instructed on how to utilize the device. ------------       ----------------------------------------------- Home Exercise The patient is instructed on a home exercise program.  OPHELIA LEYVA Acting as a Scribe for Dr. Jossue GRAMAJO, Ophelia Leyva, attest that this documentation has been prepared under the direction and in the presence of Provider Dr. Marcum.  Activity Modification The patient was advised to modify their activities.  Dx / Natural History The patient was advised of the diagnosis. The natural history of the pathology was explained in full to the patient in layman's terms. Several different treatment options were discussed and explained in full to the patient including the risks and benefits of both surgical and non-surgical treatments.  All questions and concerns were answered.  Pain Guide Activities The patient was advised to let pain guide the gradual advancement of activities.  RICE I explained to the patient that rest, ice, compression, and elevation would benefit them. They may return to activity after follow-up or when they no longer have any pain.  The patient's current medication management of their orthopedic diagnosis was reviewed today: (1) We discussed a comprehensive treatment plan that included possible pharmaceutical management involving the use of prescription strength medications including but not limited to options such as oral Naprosyn 500mg BID, once daily Meloxicam 15 mg, or 500-650 mg Tylenol versus over the counter oral medications and topical prescription NSAID Pennsaid vs over the counter Voltaren gel. (2) There is a moderate risk of morbidity with further treatment, especially from use of prescription strength medications and possible side effects of these medications which include upset stomach with oral medications, skin reactions to topical medications and cardiac/renal issues with long term use. (3) I recommended that the patient follow-up with their medical physician to discuss any significant specific potential issues with long term medication use such as interactions with current medications or with exacerbation of underlying medical comorbidities. (4) The benefits and risks associated with use of injectable, oral or topical, prescription and over the counter anti-inflammatory medications were discussed with the patient. The patient voiced understanding of the risks including but not limited to bleeding, stroke, kidney dysfunction, heart disease, and were referred to the black box warning label for further information.

## 2024-09-04 NOTE — PHYSICAL EXAM
[de-identified] : Neurovascularly intact distally  Right Elbow: small effusion olecranon bursa tenderness palpable calcific bodies triceps tenderness full ROM ligamental stable

## 2024-09-04 NOTE — HISTORY OF PRESENT ILLNESS
[de-identified] : The patient is a 29 year old left hand dominant male who presents today complaining of right elbow.   Date of Injury/Onset: ~ 2024 Pain:    At Rest: 0/10  With Activity:  4/10  Mechanism of injury: NKI, patient is experiencing swelling and fluid build-up  This is NOT a Work Related Injury being treated under Worker's Compensation. This is NOT an athletic injury occurring associated with an interscholastic or organized sports team. Quality of symptoms: sharp  Improves with: aspiration Worse with: pressure and lifting  Prior treatment: Orthopedic - Dr. Jackson would aspirate the right elbow, but he is currently "out of town"  Prior Imagin24 MRI @  Out of work/sport: none School/Sport/Position/Occupation: Full-time security  Additional Information: None

## 2024-09-10 ENCOUNTER — APPOINTMENT (OUTPATIENT)
Dept: ORTHOPEDIC SURGERY | Facility: CLINIC | Age: 29
End: 2024-09-10

## 2024-09-10 VITALS — HEIGHT: 76 IN | BODY MASS INDEX: 29.22 KG/M2 | WEIGHT: 240 LBS

## 2024-09-10 DIAGNOSIS — M70.21 OLECRANON BURSITIS, RIGHT ELBOW: ICD-10-CM

## 2024-09-10 DIAGNOSIS — M24.021 LOOSE BODY IN RIGHT ELBOW: ICD-10-CM

## 2024-09-10 DIAGNOSIS — M79.18 MYALGIA, OTHER SITE: ICD-10-CM

## 2024-09-10 PROCEDURE — 20611 DRAIN/INJ JOINT/BURSA W/US: CPT | Mod: RT

## 2024-09-10 PROCEDURE — 99214 OFFICE O/P EST MOD 30 MIN: CPT | Mod: 25

## 2024-09-11 NOTE — ADDENDUM
[FreeTextEntry1] : Documented by Adin Urias acting as a scribe for Dr. Marcum and Devon Rider PA-C on 09/10/2024 and was presence for the following sections: Physical Exam; Data Reviewed; Assessment; Discussion/Summary. All medical record entries made by the Scribe were at my, Dr. Marcum, and Devon Rider, direction and personally dictated by me on 09/10/2024. I have reviewed the chart and agree that the record accurately reflects my personal performance of the history, physical exam, procedure and imaging.

## 2024-09-11 NOTE — HISTORY OF PRESENT ILLNESS
[de-identified] : The patient is a 29 year old left hand dominant male who presents today complaining of right elbow.   Date of Injury/Onset: ~ April 2024 Pain:    At Rest: 0/10  With Activity:  0/10  Mechanism of injury: NKI, patient is experiencing swelling and fluid build-up  This is NOT a Work Related Injury being treated under Worker's Compensation. This is NOT an athletic injury occurring associated with an interscholastic or organized sports team. Quality of symptoms: sharp, swelling  Improves with: aspiration Worse with: pressure and lifting  Treatment/imaging since last visit: aspiration Out of work/sport: none School/Sport/Position/Occupation: Full-time security  Changes since last visit: patient is looking to get an aspiration  Additional Information: None

## 2024-09-11 NOTE — DISCUSSION/SUMMARY
[de-identified] : Aspirated 3cc of clear synovial from right elbow using ultrasound for proper needle placement. Follow up for scheduled surgery on 10/24/24.     ULTRASOUND GUIDED ASPIRATION - PROCEDURE Risks and benefits of aspiration were explained to the patient including but not limited to infection, recurrent hemarthrosis, pain at needle site, and recurrence of effusion. The elbow was prepped in the usual sterile fashion and under sterile condition injection of 3 cc of lidocaine was injected into the subcutaneous tissue. Followed by aspiration using an 18 gauge needle. The patient was instructed to rest, ice, and place compression on the elbow for at least 24 hours. There were also instructed to call for fevers drainage, increasing pain or any other concerns.      ----------------------------------------------- Home Exercise The patient is instructed on a home exercise program.  OPHELIA LEYVA Acting as a Scribe for Dr. Jossue GRAMAJO, Ophelia Leyva, attest that this documentation has been prepared under the direction and in the presence of Provider Dr. Marcum.  Activity Modification The patient was advised to modify their activities.  Dx / Natural History The patient was advised of the diagnosis. The natural history of the pathology was explained in full to the patient in layman's terms. Several different treatment options were discussed and explained in full to the patient including the risks and benefits of both surgical and non-surgical treatments.  All questions and concerns were answered.  Pain Guide Activities The patient was advised to let pain guide the gradual advancement of activities.  MARCIN GRAMAJO explained to the patient that rest, ice, compression, and elevation would benefit them. They may return to activity after follow-up or when they no longer have any pain.  The patient's current medication management of their orthopedic diagnosis was reviewed today: (1) We discussed a comprehensive treatment plan that included possible pharmaceutical management involving the use of prescription strength medications including but not limited to options such as oral Naprosyn 500mg BID, once daily Meloxicam 15 mg, or 500-650 mg Tylenol versus over the counter oral medications and topical prescription NSAID Pennsaid vs over the counter Voltaren gel. (2) There is a moderate risk of morbidity with further treatment, especially from use of prescription strength medications and possible side effects of these medications which include upset stomach with oral medications, skin reactions to topical medications and cardiac/renal issues with long term use. (3) I recommended that the patient follow-up with their medical physician to discuss any significant specific potential issues with long term medication use such as interactions with current medications or with exacerbation of underlying medical comorbidities. (4) The benefits and risks associated with use of injectable, oral or topical, prescription and over the counter anti-inflammatory medications were discussed with the patient. The patient voiced understanding of the risks including but not limited to bleeding, stroke, kidney dysfunction, heart disease, and were referred to the black box warning label for further information.

## 2024-09-11 NOTE — PHYSICAL EXAM
[de-identified] : Neurovascularly intact distally  Right Elbow: small effusion olecranon bursa tenderness palpable calcific bodies triceps tenderness full ROM ligamental stable

## 2024-09-11 NOTE — PHYSICAL EXAM
[de-identified] : Neurovascularly intact distally  Right Elbow: small effusion olecranon bursa tenderness palpable calcific bodies triceps tenderness full ROM ligamental stable

## 2024-09-11 NOTE — DISCUSSION/SUMMARY
[de-identified] : Aspirated 3cc of clear synovial from right elbow using ultrasound for proper needle placement. Follow up for scheduled surgery on 10/24/24.     ULTRASOUND GUIDED ASPIRATION - PROCEDURE Risks and benefits of aspiration were explained to the patient including but not limited to infection, recurrent hemarthrosis, pain at needle site, and recurrence of effusion. The elbow was prepped in the usual sterile fashion and under sterile condition injection of 3 cc of lidocaine was injected into the subcutaneous tissue. Followed by aspiration using an 18 gauge needle. The patient was instructed to rest, ice, and place compression on the elbow for at least 24 hours. There were also instructed to call for fevers drainage, increasing pain or any other concerns.      ----------------------------------------------- Home Exercise The patient is instructed on a home exercise program.  OPHELIA LEYVA Acting as a Scribe for Dr. Jossue GRAMAJO, Ophelia Leyva, attest that this documentation has been prepared under the direction and in the presence of Provider Dr. Marcum.  Activity Modification The patient was advised to modify their activities.  Dx / Natural History The patient was advised of the diagnosis. The natural history of the pathology was explained in full to the patient in layman's terms. Several different treatment options were discussed and explained in full to the patient including the risks and benefits of both surgical and non-surgical treatments.  All questions and concerns were answered.  Pain Guide Activities The patient was advised to let pain guide the gradual advancement of activities.  MARCIN GRAMAJO explained to the patient that rest, ice, compression, and elevation would benefit them. They may return to activity after follow-up or when they no longer have any pain.  The patient's current medication management of their orthopedic diagnosis was reviewed today: (1) We discussed a comprehensive treatment plan that included possible pharmaceutical management involving the use of prescription strength medications including but not limited to options such as oral Naprosyn 500mg BID, once daily Meloxicam 15 mg, or 500-650 mg Tylenol versus over the counter oral medications and topical prescription NSAID Pennsaid vs over the counter Voltaren gel. (2) There is a moderate risk of morbidity with further treatment, especially from use of prescription strength medications and possible side effects of these medications which include upset stomach with oral medications, skin reactions to topical medications and cardiac/renal issues with long term use. (3) I recommended that the patient follow-up with their medical physician to discuss any significant specific potential issues with long term medication use such as interactions with current medications or with exacerbation of underlying medical comorbidities. (4) The benefits and risks associated with use of injectable, oral or topical, prescription and over the counter anti-inflammatory medications were discussed with the patient. The patient voiced understanding of the risks including but not limited to bleeding, stroke, kidney dysfunction, heart disease, and were referred to the black box warning label for further information.

## 2024-09-11 NOTE — HISTORY OF PRESENT ILLNESS
[de-identified] : The patient is a 29 year old left hand dominant male who presents today complaining of right elbow.   Date of Injury/Onset: ~ April 2024 Pain:    At Rest: 0/10  With Activity:  0/10  Mechanism of injury: NKI, patient is experiencing swelling and fluid build-up  This is NOT a Work Related Injury being treated under Worker's Compensation. This is NOT an athletic injury occurring associated with an interscholastic or organized sports team. Quality of symptoms: sharp, swelling  Improves with: aspiration Worse with: pressure and lifting  Treatment/imaging since last visit: aspiration Out of work/sport: none School/Sport/Position/Occupation: Full-time security  Changes since last visit: patient is looking to get an aspiration  Additional Information: None

## 2024-10-24 ENCOUNTER — RESULT REVIEW (OUTPATIENT)
Age: 29
End: 2024-10-24

## 2024-10-24 ENCOUNTER — APPOINTMENT (OUTPATIENT)
Dept: ORTHOPEDIC SURGERY | Facility: AMBULATORY SURGERY CENTER | Age: 29
End: 2024-10-24

## 2024-10-24 PROCEDURE — 24342 REPAIR OF RUPTURED TENDON: CPT | Mod: RT

## 2024-10-24 PROCEDURE — 24342 REPAIR OF RUPTURED TENDON: CPT | Mod: AS,RT

## 2024-10-24 PROCEDURE — 24105 EXCISION OLECRANON BURSA: CPT | Mod: 59,RT

## 2024-10-24 PROCEDURE — 24101 ARTHRT ELBW JT EXPL BX RMVL: CPT | Mod: 59,RT

## 2024-10-24 RX ORDER — DOCUSATE SODIUM 50 MG/1
50 CAPSULE, LIQUID FILLED ORAL
Qty: 21 | Refills: 0 | Status: ACTIVE | COMMUNITY
Start: 2024-10-24 | End: 1900-01-01

## 2024-10-24 RX ORDER — ONDANSETRON 4 MG/1
4 TABLET, ORALLY DISINTEGRATING ORAL EVERY 8 HOURS
Qty: 12 | Refills: 0 | Status: ACTIVE | COMMUNITY
Start: 2024-10-24 | End: 1900-01-01

## 2024-10-24 RX ORDER — OXYCODONE AND ACETAMINOPHEN 5; 325 MG/1; MG/1
5-325 TABLET ORAL
Qty: 30 | Refills: 0 | Status: ACTIVE | COMMUNITY
Start: 2024-10-24 | End: 1900-01-01

## 2024-10-24 RX ORDER — IBUPROFEN 800 MG/1
800 TABLET, FILM COATED ORAL TWICE DAILY
Qty: 28 | Refills: 0 | Status: ACTIVE | COMMUNITY
Start: 2024-10-24 | End: 1900-01-01

## 2024-10-30 ENCOUNTER — APPOINTMENT (OUTPATIENT)
Dept: ORTHOPEDIC SURGERY | Facility: CLINIC | Age: 29
End: 2024-10-30
Payer: MEDICAID

## 2024-10-30 VITALS — HEIGHT: 76 IN | BODY MASS INDEX: 29.22 KG/M2 | WEIGHT: 240 LBS

## 2024-10-30 DIAGNOSIS — M25.521 PAIN IN RIGHT ELBOW: ICD-10-CM

## 2024-10-30 DIAGNOSIS — M70.21 OLECRANON BURSITIS, RIGHT ELBOW: ICD-10-CM

## 2024-10-30 DIAGNOSIS — M79.18 MYALGIA, OTHER SITE: ICD-10-CM

## 2024-10-30 DIAGNOSIS — M24.021 LOOSE BODY IN RIGHT ELBOW: ICD-10-CM

## 2024-10-30 PROCEDURE — 99024 POSTOP FOLLOW-UP VISIT: CPT
